# Patient Record
(demographics unavailable — no encounter records)

---

## 2025-05-28 NOTE — END OF VISIT
[FreeTextEntry3] :  All medical record entries made by the domoibe were at BARBARA ceja KENNETH, direction and personally dictated by me on 5/28/2025. I have reviewed the chart and agree that the record accurately reflects my personal performance of the history, physical exam, assessment, and plan. I have also personally directed, reviewed, and agreed with the chart.

## 2025-05-28 NOTE — REVIEW OF SYSTEMS
[Lower Ext Edema] : lower extremity edema [Joint Swelling] : joint swelling [Limb Swelling] : limb swelling [Fever] : no fever [Chills] : no chills [Limb Pain] : limb pain

## 2025-05-28 NOTE — ASSESSMENT
[FreeTextEntry1] : 66-year-old female presents with a chief complaint of swelling, burning sensation, and discoloration of the lower extremities, right worse than left. The patient was seen by her podiatrist who recommended applying Betamethasone to her legs and that she wear compression stockings daily. She reports some relief with this therapy.. The patient underwent a lower extremity venous duplex which demonstrated no DVT or SVT. She has SVI of the bilateral GSVs. The patient reports her symptoms are more worse on the right.  Patient continues to have swelling despite wearing compression stockings. She is an excellent candidate for an ablation of the bilateral greater saphenous veins. Risks and benefits were discussed with patient including infection, bleeding, and DVT. The patient would like to proceed with the right leg. Patient will continue to wear compression stockings until the time of the procedure.

## 2025-05-28 NOTE — PHYSICAL EXAM
[Normal Breath Sounds] : Normal breath sounds [2+] : left 2+ [Ankle Swelling (On Exam)] : present [Ankle Swelling Bilaterally] : bilaterally  [Ankle Swelling On The Right] : mild [] : bilaterally [JVD] : no jugular venous distention  [Ankle Swelling On The Left] : moderate [de-identified] : Awake and alert [de-identified] : Moderate stasis changes bilateral medial gator zones right > left [de-identified] :  No gross motor or sensory deficits. [de-identified] : Appropriate

## 2025-05-28 NOTE — HISTORY OF PRESENT ILLNESS
[FreeTextEntry1] : 66 year old female with history of bl knee replacement, presents for a vascular evaluation. The patient reports that she noticed discoloration of the lower extremities approximately 1 year ago.  She reports worsening swelling, and burning sensations of the lower extremities, right worse than left. She was seen by her podiatrist and given betamethasone ointment to apply to the discoloration with some relief of her symptoms.  She has also been wearing compression stockings regularly.   She report mild relief with compression therapy.  She denies hx of DVT or SVT. She is here to discuss treatment options.